# Patient Record
Sex: MALE | Race: WHITE | Employment: FULL TIME | URBAN - METROPOLITAN AREA
[De-identification: names, ages, dates, MRNs, and addresses within clinical notes are randomized per-mention and may not be internally consistent; named-entity substitution may affect disease eponyms.]

---

## 2019-05-22 NOTE — PROGRESS NOTES
"Subjective     Shant Carcamo is a 23 year old male who presents to clinic today for the following health issues:    HPI   Concern - fingernail  Onset: 10-11 months    Description:   Right ring finger - thinks he might have snagged it on something in his sleep but no recollection of this. Denies any trauma. No use of chemicals at work or other new contacts. He does note some dry skin on his feet but otherwise no other nail issues or skin changes.     Intensity: moderate    Progression of Symptoms:  same    Accompanying Signs & Symptoms:  none    Previous history of similar problem:   none    Precipitating factors:   Worsened by: none    Alleviating factors:  Improved by: none    Therapies Tried and outcome: trying to keep it clean    Went to  in January - was told to let it grow and put an antifungal (Fungi-Nail) on it. He has been using this up to 4 times daily since January or February. No change in appearance.      Reviewed and updated as needed this visit by Provider  Tobacco  Allergies  Meds  Problems  Med Hx  Surg Hx  Fam Hx         Review of Systems   Constitutional: Negative.    HENT: Negative.    Respiratory: Negative.    Cardiovascular: Negative.    Gastrointestinal: Negative.    Endocrine: Negative.    Genitourinary: Negative.    Musculoskeletal: Negative.    Skin: Negative.    Allergic/Immunologic: Negative.    Neurological: Negative.    Hematological: Negative.    Psychiatric/Behavioral: Negative.    All other systems reviewed and are negative.         Objective    /74   Pulse 70   Temp 97.9  F (36.6  C) (Oral)   Ht 1.88 m (6' 2\")   Wt 97.1 kg (214 lb)   SpO2 97%   BMI 27.48 kg/m    Body mass index is 27.48 kg/m .  Physical Exam   GENERAL: healthy, alert and no distress  SKIN: right 4th fingernail lifted from nailbed with white debris under nail - slightly thickened/discolored.    Diagnostic Test Results:  none         Assessment & Plan     1. Nail disorder, unspecified: " "unclear cause for nail separation and discoloration. ?fungal nail infection. Collected debris/nail clipping to send for culture. Okay to continue using topical antifungal for now. If culture negative, refer to derm given no history of trauma or other cause for nail separation.  - Fungus Culture,  skin, hair, or nail     BMI:   Estimated body mass index is 27.48 kg/m  as calculated from the following:    Height as of this encounter: 1.88 m (6' 2\").    Weight as of this encounter: 97.1 kg (214 lb).   Weight management plan: Discussed healthy diet and exercise guidelines      Return in about 2 weeks (around 6/6/2019) for follow up if symptoms not improving.    Nikolas Uriostegui, DO  [facility] [place]        "

## 2019-05-23 ENCOUNTER — OFFICE VISIT (OUTPATIENT)
Dept: FAMILY MEDICINE | Facility: CLINIC | Age: 24
End: 2019-05-23
Payer: COMMERCIAL

## 2019-05-23 VITALS
WEIGHT: 214 LBS | SYSTOLIC BLOOD PRESSURE: 116 MMHG | HEIGHT: 74 IN | TEMPERATURE: 97.9 F | OXYGEN SATURATION: 97 % | HEART RATE: 70 BPM | DIASTOLIC BLOOD PRESSURE: 74 MMHG | BODY MASS INDEX: 27.46 KG/M2

## 2019-05-23 DIAGNOSIS — L60.9 NAIL DISORDER, UNSPECIFIED: Primary | ICD-10-CM

## 2019-05-23 PROCEDURE — 99203 OFFICE O/P NEW LOW 30 MIN: CPT | Performed by: FAMILY MEDICINE

## 2019-05-23 PROCEDURE — 87101 SKIN FUNGI CULTURE: CPT | Performed by: FAMILY MEDICINE

## 2019-05-23 ASSESSMENT — ENCOUNTER SYMPTOMS
ENDOCRINE NEGATIVE: 1
PSYCHIATRIC NEGATIVE: 1
CONSTITUTIONAL NEGATIVE: 1
NEUROLOGICAL NEGATIVE: 1
ALLERGIC/IMMUNOLOGIC NEGATIVE: 1
HEMATOLOGIC/LYMPHATIC NEGATIVE: 1
CARDIOVASCULAR NEGATIVE: 1
RESPIRATORY NEGATIVE: 1
MUSCULOSKELETAL NEGATIVE: 1
GASTROINTESTINAL NEGATIVE: 1

## 2019-05-23 ASSESSMENT — MIFFLIN-ST. JEOR: SCORE: 2035.45

## 2019-05-27 DIAGNOSIS — L60.9 NAIL DISORDER, UNSPECIFIED: Primary | ICD-10-CM

## 2019-06-10 ENCOUNTER — MYC MEDICAL ADVICE (OUTPATIENT)
Dept: FAMILY MEDICINE | Facility: CLINIC | Age: 24
End: 2019-06-10

## 2019-06-12 ENCOUNTER — MYC MEDICAL ADVICE (OUTPATIENT)
Dept: FAMILY MEDICINE | Facility: CLINIC | Age: 24
End: 2019-06-12

## 2019-06-20 LAB
BACTERIA SPEC CULT: NORMAL
SPECIMEN SOURCE: NORMAL

## 2019-07-09 ENCOUNTER — OFFICE VISIT (OUTPATIENT)
Dept: FAMILY MEDICINE | Facility: CLINIC | Age: 24
End: 2019-07-09
Payer: COMMERCIAL

## 2019-07-09 VITALS — DIASTOLIC BLOOD PRESSURE: 74 MMHG | SYSTOLIC BLOOD PRESSURE: 118 MMHG

## 2019-07-09 DIAGNOSIS — L60.1 ONYCHOLYSIS: Primary | ICD-10-CM

## 2019-07-09 LAB
BASOPHILS # BLD AUTO: 0 10E9/L (ref 0–0.2)
BASOPHILS NFR BLD AUTO: 0.3 %
DIFFERENTIAL METHOD BLD: NORMAL
EOSINOPHIL # BLD AUTO: 0.4 10E9/L (ref 0–0.7)
EOSINOPHIL NFR BLD AUTO: 6 %
ERYTHROCYTE [DISTWIDTH] IN BLOOD BY AUTOMATED COUNT: 12.6 % (ref 10–15)
HCT VFR BLD AUTO: 43.2 % (ref 40–53)
HGB BLD-MCNC: 14.7 G/DL (ref 13.3–17.7)
LYMPHOCYTES # BLD AUTO: 2.3 10E9/L (ref 0.8–5.3)
LYMPHOCYTES NFR BLD AUTO: 36.9 %
MCH RBC QN AUTO: 29.9 PG (ref 26.5–33)
MCHC RBC AUTO-ENTMCNC: 34 G/DL (ref 31.5–36.5)
MCV RBC AUTO: 88 FL (ref 78–100)
MONOCYTES # BLD AUTO: 0.7 10E9/L (ref 0–1.3)
MONOCYTES NFR BLD AUTO: 11.4 %
NEUTROPHILS # BLD AUTO: 2.8 10E9/L (ref 1.6–8.3)
NEUTROPHILS NFR BLD AUTO: 45.4 %
PLATELET # BLD AUTO: 199 10E9/L (ref 150–450)
RBC # BLD AUTO: 4.91 10E12/L (ref 4.4–5.9)
WBC # BLD AUTO: 6.1 10E9/L (ref 4–11)

## 2019-07-09 PROCEDURE — 85025 COMPLETE CBC W/AUTO DIFF WBC: CPT | Performed by: FAMILY MEDICINE

## 2019-07-09 PROCEDURE — 88304 TISSUE EXAM BY PATHOLOGIST: CPT | Mod: TC | Performed by: FAMILY MEDICINE

## 2019-07-09 PROCEDURE — 88312 SPECIAL STAINS GROUP 1: CPT | Performed by: FAMILY MEDICINE

## 2019-07-09 PROCEDURE — 36415 COLL VENOUS BLD VENIPUNCTURE: CPT | Performed by: FAMILY MEDICINE

## 2019-07-09 PROCEDURE — 82728 ASSAY OF FERRITIN: CPT | Performed by: FAMILY MEDICINE

## 2019-07-09 PROCEDURE — 99213 OFFICE O/P EST LOW 20 MIN: CPT | Performed by: FAMILY MEDICINE

## 2019-07-09 NOTE — LETTER
7/9/2019         RE: Shant Carcamo  [address] [place] [address]        Dear Colleague,    Thank you for referring your patient, Shant Carcamo, to the [facility]. Please see a copy of my visit note below.    [facility] - PRIMARY CARE SKIN    CC:  fingernail  SUBJECTIVE:   Shant Carcamo is a(n) 24 year old male who presents to clinic today because of nail separation of the right ring finger that started 1 year ago.    Nail separation from the nail bed was noticed acutely upon waking one morning. He does not recall any history of injury to the finger. He had tried to clean out debris underneath the nail prior to the nail separation.    Fungal culture has been negative.    Family Medical History  Skin Cancer: NO  Eczema Psoriasis Autoimmune   NO YES - in a half-brother NO     Occupation: previously warehouse, ,  (mostly indoor).    Refer to electronic medical record (EMR) for past medical history and medications.    INTEGUMENTARY/SKIN: POSITIVE for nail changes  ROS: 14 point review of systems was negative except the symptoms listed above in the HPI.    This document serves as a record of the services and decisions personally performed and made by Lexi Rodriges MD and was created by Luis Conti, a trained medical scribe, based on personal observations and provider statements to the medical scribe.  July 9, 2019 8:50 AM   Luis Conti    OBJECTIVE:   GENERAL: healthy, alert and no distress.  SKIN: Neumann Skin Type - I-II.  Fingers examined. The dermatoscope was used to help evaluate pigmented lesions.  Skin Pertinent Findings:  Right ring finger: Separation of the distal 3/4 of nail. Thick keratin at base of nail.    Other fingernails appear healthy and normal.    Diagnostic Test Results:      ASSESSMENT:     Encounter Diagnosis   Name Primary?     Onycholysis Yes     MDM: most likely related to repetitive trama    PLAN:   Patient Instructions    FUTURE APPOINTMENTS  Please get labs done today.  Follow up per pathology results.    TT: 20 minutes.  CT: 15 minutes.    The information in this document, created by the medical scribe for me, accurately reflects the services I personally performed and the decisions made by me. I have reviewed and approved this document for accuracy prior to leaving the patient care area.  July 9, 2019 8:50 AM  Lexi Rodriges MD  [facility]    Again, thank you for allowing me to participate in the care of your patient.        Sincerely,        Lexi Rodriges MD

## 2019-07-09 NOTE — LETTER
July 9, 2019    Shant Carcamo  [address] [place] [address]  MEDICAL EXCUSE FORM      Shant Carcamo saw me on 7/9/2019 for a medical appointment.    Shant can return to work today with out restrictions    COMMENTS: None        Sincerely,      Lexi Rodriges M.D.

## 2019-07-09 NOTE — PROGRESS NOTES
[facility] - PRIMARY CARE SKIN    CC:  fingernail  SUBJECTIVE:   Shant Carcamo is a(n) 24 year old male who presents to clinic today because of nail separation of the right ring finger that started 1 year ago.    Nail separation from the nail bed was noticed acutely upon waking one morning. He does not recall any history of injury to the finger. He had tried to clean out debris underneath the nail prior to the nail separation.    Fungal culture has been negative.    Family Medical History  Skin Cancer: NO  Eczema Psoriasis Autoimmune   NO YES - in a half-brother NO     Occupation: previously warehouse, ,  (mostly indoor).    Refer to electronic medical record (EMR) for past medical history and medications.    INTEGUMENTARY/SKIN: POSITIVE for nail changes  ROS: 14 point review of systems was negative except the symptoms listed above in the HPI.    This document serves as a record of the services and decisions personally performed and made by Lexi Rodriges MD and was created by Luis Conti, a trained medical scribe, based on personal observations and provider statements to the medical scribe.  July 9, 2019 8:50 AM   Luis Conti    OBJECTIVE:   GENERAL: healthy, alert and no distress.  SKIN: Neumann Skin Type - I-II.  Fingers examined. The dermatoscope was used to help evaluate pigmented lesions.  Skin Pertinent Findings:  Right ring finger: Separation of the distal 3/4 of nail. Thick keratin at base of nail.    Other fingernails appear healthy and normal.    Diagnostic Test Results:      ASSESSMENT:     Encounter Diagnosis   Name Primary?     Onycholysis Yes     MDM: most likely related to repetitive trama    PLAN:   Patient Instructions   FUTURE APPOINTMENTS  Please get labs done today.  Follow up per pathology results.    TT: 20 minutes.  CT: 15 minutes.    The information in this document, created by the medical scribe for me, accurately reflects the services I personally  performed and the decisions made by me. I have reviewed and approved this document for accuracy prior to leaving the patient care area.  July 9, 2019 8:50 AM  Lexi Rodriges MD  [place]

## 2019-07-10 LAB — FERRITIN SERPL-MCNC: 101 NG/ML (ref 26–388)

## 2019-07-15 LAB — COPATH REPORT: NORMAL

## 2019-07-18 ENCOUNTER — TELEPHONE (OUTPATIENT)
Dept: FAMILY MEDICINE | Facility: CLINIC | Age: 24
End: 2019-07-18

## 2019-07-18 DIAGNOSIS — B35.1 ONYCHOMYCOSIS: Primary | ICD-10-CM

## 2019-07-18 RX ORDER — TERBINAFINE HYDROCHLORIDE 250 MG/1
250 TABLET ORAL DAILY
Qty: 42 TABLET | Refills: 0 | Status: SHIPPED | OUTPATIENT
Start: 2019-07-18 | End: 2019-10-15

## 2019-07-18 NOTE — TELEPHONE ENCOUNTER
Left message on answering machine for patient to call back.    Adrianna Calzada,RN BSN  [facility]  [phone]

## 2019-07-18 NOTE — TELEPHONE ENCOUNTER
----- Message from Lexi Rodriges MD sent at 7/18/2019  1:53 PM CDT -----  Call Shant explain that there was evidence of fungal infection.   Recommendations :      Terbinafine 250 mg one cap po q day times 6 weeks      Recheck at 6 weeks and LFT test at that time.      Treatment may required 3 months of treatment.     Faxed to [facility] pharmacy in [place]          Thank you,   Lexi Rodriges M.D.

## 2019-07-19 NOTE — TELEPHONE ENCOUNTER
Called and spoke to patient. Educated patient on culture results- evidence of fungal infection. Tx includes Terbinafine 250mg one cap po q day times 6 weeks. Advised patient to return to clinic for a re-check at 6 weeks and LFT test at that time. Informed patient 3 months of treatment may be required. Informed patient the Rx has been faxed to [facility] pharmacy in [place]. Patient voiced understanding.    RANJIT Fernandez-BSN  [facility]  [phone]

## 2019-07-19 NOTE — TELEPHONE ENCOUNTER
Reason for Call:  Other returning call    Detailed comments: The patient is returning a call left for him.     Phone Number Patient can be reached at: Cell number at [phone]    Best Time: Anytime    Can we leave a detailed message on this number? YES    Call taken on 7/19/2019 at 12:24 PM by Sonia Alvarado

## 2019-08-12 ENCOUNTER — TELEPHONE (OUTPATIENT)
Dept: FAMILY MEDICINE | Facility: CLINIC | Age: 24
End: 2019-08-12

## 2019-08-12 NOTE — TELEPHONE ENCOUNTER
Central Prior Authorization Team   Phone: [phone]    PA Initiation    Medication:   Insurance Company: [employer] - Phone [phone] Fax [phone]  Pharmacy Filling the Rx: [id number] [place] [address] [address]  Filling Pharmacy Phone: [phone]  Filling Pharmacy Fax:    Start Date: 8/12/2019    KERRY HU (Justice: [id number])

## 2019-08-12 NOTE — TELEPHONE ENCOUNTER
[facility]  pharmacy says they have contacted us multiple times for this pa- there are no previous contacts int is chart regarding this medication    Prior Authorization Retail Medication Request      Medication/Dose:Terbinafine   ICD code (if different than what is on RX):  Onychomycosis [B35.1]  - Primary   Previously Tried and Failed:Onychomycosis  Rationale:      Insurance Name:    Coverage information:     Subscriber: [id number] YANNI HU     Rel to sub: 03 - Child     Member ID: [id number]     Payor: [facility] Ph: [phone]     Benefit plan: [id number] Ph: [phone]     Group number: [id number]     Member effective dates: from 01/01/19          Pharmacy Information (if different than what is on RX)  Name:    Phone:

## 2019-08-13 NOTE — TELEPHONE ENCOUNTER
Central Prior Authorization Team   Phone: [phone]    Prior Authorization Approval    Authorization Effective Date: 8/13/2019  Authorization Expiration Date: 11/13/2019  Medication:   Approved Dose/Quantity:   Reference #: [id number]   Insurance Company: [employer] - Phone [phone] Fax [phone]  Expected CoPay:       CoPay Card Available:      Foundation Assistance Needed:    Which Pharmacy is filling the prescription (Not needed for infusion/clinic administered): [id number] [place] [facility] [address] [address]  Pharmacy Notified: Yes  Patient Notified: Yes, sent patient a [employer] message regarding approval

## 2019-10-15 ENCOUNTER — OFFICE VISIT (OUTPATIENT)
Dept: FAMILY MEDICINE | Facility: CLINIC | Age: 24
End: 2019-10-15
Payer: COMMERCIAL

## 2019-10-15 VITALS — SYSTOLIC BLOOD PRESSURE: 138 MMHG | DIASTOLIC BLOOD PRESSURE: 62 MMHG

## 2019-10-15 DIAGNOSIS — B35.1 ONYCHOMYCOSIS: Primary | ICD-10-CM

## 2019-10-15 DIAGNOSIS — Z51.81 ENCOUNTER FOR THERAPEUTIC DRUG MONITORING: ICD-10-CM

## 2019-10-15 DIAGNOSIS — L60.1 ONYCHOLYSIS: ICD-10-CM

## 2019-10-15 PROCEDURE — 36415 COLL VENOUS BLD VENIPUNCTURE: CPT | Performed by: FAMILY MEDICINE

## 2019-10-15 PROCEDURE — 99213 OFFICE O/P EST LOW 20 MIN: CPT | Performed by: FAMILY MEDICINE

## 2019-10-15 PROCEDURE — 80076 HEPATIC FUNCTION PANEL: CPT | Performed by: FAMILY MEDICINE

## 2019-10-15 RX ORDER — TERBINAFINE HYDROCHLORIDE 250 MG/1
250 TABLET ORAL DAILY
Qty: 90 TABLET | Refills: 0 | Status: SHIPPED | OUTPATIENT
Start: 2019-10-15 | End: 2020-03-02 | Stop reason: ALTCHOICE

## 2019-10-15 NOTE — PROGRESS NOTES
[facility] - PRIMARY CARE SKIN    CC: onychomycosis  SUBJECTIVE:   Shant Carcamo is a(n) 24 year old male who presents to clinic today for follow-up of onychomycosis involving the right ring finger first starting in summer 2018.    Nail separation from the nail bed was noticed acutely upon waking one morning. He has not recalled any history of injury to the finger. He had tried to clean out debris underneath the nail prior to the nail separation.    After pathology on 7/9/19 indicated onychomycosis, terbinafine 250 mg every day was started in August 2019 for 6 weeks. He is concerned about continued nail separation. He denies any side effects from oral terbinafine. He has also been using an OTC product Undecylenic Acid 25%.    Family Medical History  Skin Cancer: NO  Eczema Psoriasis Autoimmune   NO YES - in a half-brother NO     Occupation: previously warehouse, ,  previously (mostly indoor).    Refer to electronic medical record (EMR) for past medical history and medications.    ROS: 14 point review of systems was negative except the symptoms listed above in the HPI.    This document serves as a record of the services and decisions personally performed and made by Lexi Rodriges MD and was created by Luis Conti, a trained medical scribe, based on personal observations and provider statements to the medical scribe.  October 15, 2019 3:29 PM   Luis Conti    OBJECTIVE:   GENERAL: healthy, alert and no distress.  SKIN: Neumann Skin Type - I-II.  Fingers examined. The dermatoscope was used to help evaluate pigmented lesions.  Skin Pertinent Findings:  Right ring finger: dense white changes involving distal 3/4 of the nail, lifting of the nail from the bed distally; proximal 1/4 of the nail has normal growth    ASSESSMENT:     Encounter Diagnoses   Name Primary?     Onychomycosis Yes     Encounter for therapeutic drug monitoring      Onycholysis      MDM: explained duration of treatment  with terbinafine and by treating the fungal infection the nail should ultimately stay attached as the proximal nail grows out.    PLAN:   Patient Instructions   FUTURE APPOINTMENTS  Please get labs done today.  Follow up in 3 months.    ORAL ANTIFUNGAL  Take by mouth 1 tablet of terbinafine (Lamisil) 250 mg one time(s) a day for 3 more months. Make sure to finish the entire antifungal regimen.    Consider soaking the fingernail for 5-10 minutes prior to trimming it.    TT: 20 minutes.  CT: 15 minutes.    The information in this document, created by the medical scribe for me, accurately reflects the services I personally performed and the decisions made by me. I have reviewed and approved this document for accuracy prior to leaving the patient care area.  October 15, 2019 3:28 PM  Lexi Rodriges MD  [facility]

## 2019-10-15 NOTE — LETTER
October 15, 2019    Shant Carcamo  [address] [address]  MEDICAL EXCUSE FORM      Shant Carcamo seen and treated by me on 10/15/2019 at the clinic. Any questions please contact me at the clinic.        COMMENTS: None        Sincerely,      Lexi Rodriges M.D.

## 2019-10-15 NOTE — PATIENT INSTRUCTIONS
FUTURE APPOINTMENTS  Please get labs done today.  Follow up in 3 months.    ORAL ANTIFUNGAL  Take by mouth 1 tablet of terbinafine (Lamisil) 250 mg one time(s) a day for 3 more months. Make sure to finish the entire antifungal regimen.    Consider soaking the fingernail for 5-10 minutes prior to trimming it.

## 2019-10-15 NOTE — LETTER
10/15/2019         RE: Shant Carcamo  [address] [address]        Dear Colleague,    Thank you for referring your patient, Shant Carcamo, to the [facility]. Please see a copy of my visit note below.    [facility] - PRIMARY CARE SKIN    CC: onychomycosis  SUBJECTIVE:   Shant Carcamo is a(n) 24 year old male who presents to clinic today for follow-up of onychomycosis involving the right ring finger first starting in summer 2018.    Nail separation from the nail bed was noticed acutely upon waking one morning. He has not recalled any history of injury to the finger. He had tried to clean out debris underneath the nail prior to the nail separation.    After pathology on 7/9/19 indicated onychomycosis, terbinafine 250 mg every day was started in August 2019 for 6 weeks. He is concerned about continued nail separation. He denies any side effects from oral terbinafine. He has also been using an OTC product Undecylenic Acid 25%.    Family Medical History  Skin Cancer: NO  Eczema Psoriasis Autoimmune   NO YES - in a half-brother NO     Occupation: previously warehouse, ,  previously (mostly indoor).    Refer to electronic medical record (EMR) for past medical history and medications.    ROS: 14 point review of systems was negative except the symptoms listed above in the HPI.    This document serves as a record of the services and decisions personally performed and made by Lexi Rodriges MD and was created by Luis Conti, a trained medical scribe, based on personal observations and provider statements to the medical scribe.  October 15, 2019 3:29 PM   Luis Conti    OBJECTIVE:   GENERAL: healthy, alert and no distress.  SKIN: Neumann Skin Type - I-II.  Fingers examined. The dermatoscope was used to help evaluate pigmented lesions.  Skin Pertinent Findings:  Right ring finger: dense white changes involving distal 3/4 of the nail, lifting of the nail from the  bed distally; proximal 1/4 of the nail has normal growth    ASSESSMENT:     Encounter Diagnoses   Name Primary?     Onychomycosis Yes     Encounter for therapeutic drug monitoring      Onycholysis      MDM: explained duration of treatment with terbinafine and by treating the fungal infection the nail should ultimately stay attached as the proximal nail grows out.    PLAN:   Patient Instructions   FUTURE APPOINTMENTS  Please get labs done today.  Follow up in 3 months.    ORAL ANTIFUNGAL  Take by mouth 1 tablet of terbinafine (Lamisil) 250 mg one time(s) a day for 3 more months. Make sure to finish the entire antifungal regimen.    Consider soaking the fingernail for 5-10 minutes prior to trimming it.    TT: 20 minutes.  CT: 15 minutes.    The information in this document, created by the medical scribe for me, accurately reflects the services I personally performed and the decisions made by me. I have reviewed and approved this document for accuracy prior to leaving the patient care area.  October 15, 2019 3:28 PM  Lexi Rodriges MD  [facility]    Again, thank you for allowing me to participate in the care of your patient.        Sincerely,        Lexi Rodriges MD

## 2019-10-16 LAB
ALBUMIN SERPL-MCNC: 4.8 G/DL (ref 3.4–5)
ALP SERPL-CCNC: 74 U/L (ref 40–150)
ALT SERPL W P-5'-P-CCNC: 34 U/L (ref 0–70)
AST SERPL W P-5'-P-CCNC: 19 U/L (ref 0–45)
BILIRUB DIRECT SERPL-MCNC: <0.1 MG/DL (ref 0–0.2)
BILIRUB SERPL-MCNC: 0.5 MG/DL (ref 0.2–1.3)
PROT SERPL-MCNC: 7.6 G/DL (ref 6.8–8.8)

## 2019-11-07 ENCOUNTER — HEALTH MAINTENANCE LETTER (OUTPATIENT)
Age: 24
End: 2019-11-07

## 2019-12-13 DIAGNOSIS — B35.1 ONYCHOMYCOSIS: ICD-10-CM

## 2019-12-13 DIAGNOSIS — Z51.81 ENCOUNTER FOR THERAPEUTIC DRUG MONITORING: ICD-10-CM

## 2019-12-13 RX ORDER — TERBINAFINE HYDROCHLORIDE 250 MG/1
250 TABLET ORAL DAILY
Qty: 90 TABLET | Refills: 0 | Status: CANCELLED | OUTPATIENT
Start: 2019-12-13

## 2019-12-13 NOTE — TELEPHONE ENCOUNTER
Reason for Call:  Other prescription    Detailed comments: Pt's mother called this afternoon and would like a refill on his lamisil prescription. Please refill this and give pt a call if there are any questions. Thank you.    Phone Number Patient can be reached at: Home number on file [phone] (home)    Best Time:     Can we leave a detailed message on this number? YES    Call taken on 12/13/2019 at 3:21 PM by Crystal Arce

## 2019-12-16 NOTE — TELEPHONE ENCOUNTER
Called home number- no CTC on file- advised mother that he needs to call back    She will let him know    should have medication til 1/15/19  Last Written Prescription Date:  10/15/19  Last Fill Quantity: 90,  # refills: 0   Last office visit: 10/15/2019 with prescribing provider:     Future Office Visit:   Next 5 appointments (look out 90 days)    Jan 21, 2020  3:20 PM CST  Return Visit with Lexi Rodriges MD  [facility] ([facility] [address] [place] [id number]  [phone]         Requested Prescriptions   Pending Prescriptions Disp Refills     terbinafine (LAMISIL) 250 MG tablet 90 tablet 0     Sig: Take 1 tablet (250 mg) by mouth daily       There is no refill protocol information for this order

## 2019-12-16 NOTE — TELEPHONE ENCOUNTER
Called patient- he states that the pharmacy did not give him a 90 day supply- he has been getting small refills.    Called pharmacist who states patiens mother picked up a 30 day supply on the 13th. They paid 14 $ with [facility] assistance card.  He will need a prior auth if they get any further medication.  Called patient and advised him of this .   he has an appointment scheduled with Dr. Rodriges in Jan.    Adrianna RODRIGUEZRN BSN  [facility]  [phone]

## 2019-12-16 NOTE — TELEPHONE ENCOUNTER
Patient returning our call. Please call him again on his cell phone. [phone]. OK to leave a detailed message  Sarah Hanna, Clinic Receptionist

## 2020-01-21 ENCOUNTER — OFFICE VISIT (OUTPATIENT)
Dept: FAMILY MEDICINE | Facility: CLINIC | Age: 25
End: 2020-01-21
Payer: COMMERCIAL

## 2020-01-21 VITALS — DIASTOLIC BLOOD PRESSURE: 80 MMHG | SYSTOLIC BLOOD PRESSURE: 138 MMHG

## 2020-01-21 DIAGNOSIS — B35.1 ONYCHOMYCOSIS: Primary | ICD-10-CM

## 2020-01-21 PROCEDURE — 80076 HEPATIC FUNCTION PANEL: CPT | Performed by: FAMILY MEDICINE

## 2020-01-21 PROCEDURE — 99213 OFFICE O/P EST LOW 20 MIN: CPT | Performed by: FAMILY MEDICINE

## 2020-01-21 PROCEDURE — 36415 COLL VENOUS BLD VENIPUNCTURE: CPT | Performed by: FAMILY MEDICINE

## 2020-01-21 RX ORDER — ITRACONAZOLE 100 MG/1
200 CAPSULE ORAL DAILY
Qty: 84 CAPSULE | Refills: 0 | Status: SHIPPED | OUTPATIENT
Start: 2020-01-21 | End: 2020-03-02

## 2020-01-21 NOTE — PATIENT INSTRUCTIONS
FUTURE APPOINTMENTS  Please get labs done today.  Follow up in 6 week(s).    ORAL ANTIFUNGAL  Take by mouth 2 tablets of itraconazole 100 mg one time(s) a day for 6 weeks.    Discontinue terbinafine at this time.    BLEACH BATH/SOAK INSTRUCTIONS  Bleach Bath/Soak:  A dilute bleach solution can be helpful in maintaining normal skin leola.  Frequency: once a day for 1 week, then decrease to three times a week.    Perform a dilute bleach bath adding plain 6% bleach in the following amounts to warm water. Do not use concentrated bleach.    Dilution amounts will be more dilute than typical swimming pools:    Mixing bowl - use 2 tbsp of bleach    Wait until water and bleach have been mixed before soaking.    Soak for 5-15 minutes.    Pat skin dry with white towels. Take care that bleach may stain towels.    Follow with a bland emollient moisturizer or cream such as Vaseline or Aquaphor.    Never apply bleach directly to your skin.  Avoid oatmeal soaks, because they only provide short-term, temporary relief of itch.

## 2020-01-21 NOTE — LETTER
January 21, 2020      Shant Carcamo  [address] [place] [address]        To Whom It May Concern:    Shant Carcamo  was seen on January 21, 2020            Sincerely,        Lexi Rodriges MD

## 2020-01-21 NOTE — PROGRESS NOTES
[facility] - PRIMARY CARE SKIN    CC: onychomycosis  SUBJECTIVE:   Shant Carcamo is a(n) 24 year old male who presents to clinic today for follow-up of onychomycosis involving the right ring finger first starting in summer 2018.    Nail separation from the nail bed was noticed acutely upon waking one morning. He has not recalled any history of injury to the finger. He had tried to clean out debris underneath the nail prior to the nail separation. After pathology on 7/9/19 indicated onychomycosis, terbinafine 250 mg every day was started in August 2019 for 6 weeks. He denies any side effects from oral terbinafine. Since Oct 15, 2019, he has continued taking terbinafine 250 mg once daily.    He has not noticed any improvement of the finger nail.    Family Medical History  Skin Cancer: NO  Eczema Psoriasis Autoimmune   NO YES - in a half-brother NO     Occupation: previously warehouse, ,  previously (mostly indoor).    Refer to electronic medical record (EMR) for past medical history and medications.    ROS: 14 point review of systems was negative except the symptoms listed above in the HPI.    This document serves as a record of the services and decisions personally performed and made by Lexi Rodriges MD and was created by Luis Conti, a trained medical scribe, based on personal observations and provider statements to the medical scribe.  January 21, 2020 3:24 PM   Luis Conti    OBJECTIVE:   GENERAL: healthy, alert and no distress.  SKIN: Neumann Skin Type - I-II.  Fingers examined. The dermatoscope was used to help evaluate pigmented lesions.  Skin Pertinent Findings:  Right ring finger: Dense white changes involving distal 3/4 of the nail, lifting of the nail from the bed distally; proximal 1/4 of the nail has normal growth. Debris collection under the nail.    ASSESSMENT:     Encounter Diagnosis   Name Primary?     Onychomycosis Yes     MDM:  He is dissatisfied with the lack of  results with oral terbinafine and states that he might be interested in a mechanical/surgical treatment to remove the infected nail. Briefly discussed the process of surgical removal of nail.    PLAN:   Patient Instructions   FUTURE APPOINTMENTS  Please get labs done today.  Follow up in 6 week(s).    ORAL ANTIFUNGAL  Take by mouth 2 tablets of itraconazole 100 mg one time(s) a day for 6 weeks.    Discontinue terbinafine at this time.    BLEACH BATH/SOAK INSTRUCTIONS  Bleach Bath/Soak:  A dilute bleach solution can be helpful in maintaining normal skin leola.  Frequency: once a day for 1 week, then decrease to three times a week.    Perform a dilute bleach bath adding plain 6% bleach in the following amounts to warm water. Do not use concentrated bleach.    Dilution amounts will be more dilute than typical swimming pools:    Mixing bowl - use 2 tbsp of bleach    Wait until water and bleach have been mixed before soaking.    Soak for 5-15 minutes.    Pat skin dry with white towels. Take care that bleach may stain towels.    Follow with a bland emollient moisturizer or cream such as Vaseline or Aquaphor.    Never apply bleach directly to your skin.  Avoid oatmeal soaks, because they only provide short-term, temporary relief of itch.      TT: 20 minutes.  CT: 15 minutes.    The information in this document, created by the medical scribe for me, accurately reflects the services I personally performed and the decisions made by me. I have reviewed and approved this document for accuracy prior to leaving the patient care area.  January 21, 2020 3:23 PM  Lexi Rodriges MD  [facility]

## 2020-01-21 NOTE — LETTER
1/21/2020         RE: Shant Carcamo  [address] [place] [address]        Dear Colleague,    Thank you for referring your patient, Shant Carcamo, to the [facility]. Please see a copy of my visit note below.    [facility] - PRIMARY CARE SKIN    CC: onychomycosis  SUBJECTIVE:   Shant Carcamo is a(n) 24 year old male who presents to clinic today for follow-up of onychomycosis involving the right ring finger first starting in summer 2018.    Nail separation from the nail bed was noticed acutely upon waking one morning. He has not recalled any history of injury to the finger. He had tried to clean out debris underneath the nail prior to the nail separation. After pathology on 7/9/19 indicated onychomycosis, terbinafine 250 mg every day was started in August 2019 for 6 weeks. He denies any side effects from oral terbinafine. Since Oct 15, 2019, he has continued taking terbinafine 250 mg once daily.    He has not noticed any improvement of the finger nail.    Family Medical History  Skin Cancer: NO  Eczema Psoriasis Autoimmune   NO YES - in a half-brother NO     Occupation: previously warehouse, ,  previously (mostly indoor).    Refer to electronic medical record (EMR) for past medical history and medications.    ROS: 14 point review of systems was negative except the symptoms listed above in the HPI.    This document serves as a record of the services and decisions personally performed and made by Lexi Rodriges MD and was created by Luis Conti, a trained medical scribe, based on personal observations and provider statements to the medical scribe.  January 21, 2020 3:24 PM   Luis Conti    OBJECTIVE:   GENERAL: healthy, alert and no distress.  SKIN: Neumann Skin Type - I-II.  Fingers examined. The dermatoscope was used to help evaluate pigmented lesions.  Skin Pertinent Findings:  Right ring finger: Dense white changes involving distal 3/4 of the nail, lifting of  the nail from the bed distally; proximal 1/4 of the nail has normal growth. Debris collection under the nail.    ASSESSMENT:     Encounter Diagnosis   Name Primary?     Onychomycosis Yes     MDM:  He is dissatisfied with the lack of results with oral terbinafine and states that he might be interested in a mechanical/surgical treatment to remove the infected nail. Briefly discussed the process of surgical removal of nail.    PLAN:   Patient Instructions   FUTURE APPOINTMENTS  Please get labs done today.  Follow up in 6 week(s).    ORAL ANTIFUNGAL  Take by mouth 2 tablets of itraconazole 100 mg one time(s) a day for 6 weeks.    Discontinue terbinafine at this time.    BLEACH BATH/SOAK INSTRUCTIONS  Bleach Bath/Soak:  A dilute bleach solution can be helpful in maintaining normal skin leola.  Frequency: once a day for 1 week, then decrease to three times a week.    Perform a dilute bleach bath adding plain 6% bleach in the following amounts to warm water. Do not use concentrated bleach.    Dilution amounts will be more dilute than typical swimming pools:    Mixing bowl - use 2 tbsp of bleach    Wait until water and bleach have been mixed before soaking.    Soak for 5-15 minutes.    Pat skin dry with white towels. Take care that bleach may stain towels.    Follow with a bland emollient moisturizer or cream such as Vaseline or Aquaphor.    Never apply bleach directly to your skin.  Avoid oatmeal soaks, because they only provide short-term, temporary relief of itch.      TT: 20 minutes.  CT: 15 minutes.    The information in this document, created by the medical scribe for me, accurately reflects the services I personally performed and the decisions made by me. I have reviewed and approved this document for accuracy prior to leaving the patient care area.  January 21, 2020 3:23 PM  Lexi Rodriges MD  [facility]    Again, thank you for allowing me to participate in the care of your patient.         Sincerely,        Lexi Rodriges MD

## 2020-01-22 LAB
ALBUMIN SERPL-MCNC: 4.8 G/DL (ref 3.4–5)
ALP SERPL-CCNC: 62 U/L (ref 40–150)
ALT SERPL W P-5'-P-CCNC: 31 U/L (ref 0–70)
AST SERPL W P-5'-P-CCNC: 20 U/L (ref 0–45)
BILIRUB DIRECT SERPL-MCNC: <0.1 MG/DL (ref 0–0.2)
BILIRUB SERPL-MCNC: 0.6 MG/DL (ref 0.2–1.3)
PROT SERPL-MCNC: 8 G/DL (ref 6.8–8.8)

## 2020-01-23 ENCOUNTER — TELEPHONE (OUTPATIENT)
Dept: FAMILY MEDICINE | Facility: CLINIC | Age: 25
End: 2020-01-23

## 2020-01-23 NOTE — TELEPHONE ENCOUNTER
Prior Authorization Retail Medication Request    Medication/Dose: itraconazole (SPORANOX) 100 MG capsule  ICD code (if different than what is on RX):    Previously Tried and Failed:    Rationale:      Insurance Name:  [employer] [place]  Insurance ID:  [id number]      Pharmacy Information (if different than what is on RX)  Name:  same  Phone:  [phone]

## 2020-01-23 NOTE — TELEPHONE ENCOUNTER
----- Message from Lexi Rodriges MD sent at 1/22/2020  3:38 PM CST -----  Please call :    Lab work looks normal.     Thank you,   Lexi Rodirges M.D.

## 2020-01-23 NOTE — TELEPHONE ENCOUNTER
Left message on answering machine for patient to call back.  Adrianna RODRIGUEZRN BSN  [facility]  [phone]      ORAL ANTIFUNGAL  Take by mouth 2 tablets of itraconazole 100 mg one time(s) a day for 6 weeks.     Discontinue terbinafine at this time.

## 2020-01-24 NOTE — TELEPHONE ENCOUNTER
Patient notified of test results and providers message, patient has no further questions.    Adrianna RODRIGUEZRN BSN  [facility]  [phone]

## 2020-01-27 NOTE — TELEPHONE ENCOUNTER
Central Prior Authorization Team   Phone: [phone]    PA Initiation    Medication: itraconazole (SPORANOX) 100 MG capsule  Insurance Company: [employer] - Phone [phone] Fax [phone]  Pharmacy Filling the Rx: [id number] - [place], [place] - [address] [address]  Filling Pharmacy Phone: [phone]  Filling Pharmacy Fax: [phone]  Start Date: 1/27/2020

## 2020-01-28 NOTE — TELEPHONE ENCOUNTER
Prior Authorization Approval    Authorization Effective Date: 1/27/2020  Authorization Expiration Date: 4/26/2020  Medication: itraconazole (SPORANOX) 100 MG-APPROVED  Approved Dose/Quantity:    Reference #:     Insurance Company: [employer] - Phone [phone] Fax [phone]  Expected CoPay:       CoPay Card Available:      Foundation Assistance Needed:    Which Pharmacy is filling the prescription (Not needed for infusion/clinic administered): [id number] - [place], [place] - [address] [address]  Pharmacy Notified: Yes  Patient Notified: Yes  **Instructed pharmacy to notify patient when script is ready to /ship.**

## 2020-02-25 NOTE — PROGRESS NOTES
[facility] - PRIMARY CARE SKIN    CC: onychomycosis  SUBJECTIVE:   Shant Carcamo is a(n) 24 year old male who presents to clinic today for follow-up of onychomycosis involving the right ring finger first starting in summer 2018.    Nail separation from the nail bed was noticed acutely upon waking one morning. He has not recalled any history of injury to the finger. He had tried to clean out debris underneath the nail prior to the nail separation. After pathology on 7/9/19 indicated onychomycosis, terbinafine 250 mg every day was started in August 2019 and taken through January 2020. He denies any side effects from oral terbinafine. Therapy was changed to itraconazole 200 mg every day on 1/21/2020 and bleach soaks. He last took itraconazole on Friday, three days ago. He is using bleach soaks 2-3 times a week currently. He is concerned that whiteness of the nail is progressing proximally. He is trying to cover his hands every night. He does not recall any recent trauma to the nail.    We briefly discussed the process of surgical removal of nail at his last office visit if oral therapy is not effective.    Family Medical History  Skin Cancer: NO  Eczema Psoriasis Autoimmune   NO YES - in a half-brother NO     Occupation: previously warehouse, ,  previously (mostly indoor).    Refer to electronic medical record (EMR) for past medical history and medications.    ROS: 14 point review of systems was negative except the symptoms listed above in the HPI.    This document serves as a record of the services and decisions personally performed and made by Lexi Rodriges MD and was created by Luis Conti, a trained medical scribe, based on personal observations and provider statements to the medical scribe.  March 2, 2020 3:31 PM   Luis Conti    OBJECTIVE:   GENERAL: healthy, alert and no distress.  HEENT: Eyes grossly normal to inspection, PERRL and conjunctivae and sclerae normal.  SKIN: Neumann  Skin Type - I-II.  Fingers examined. The dermatoscope was used to help evaluate pigmented lesions.  Skin Pertinent Findings:  Right ring finger: Healthy proximal nail. Still lifting of the distal nail involving the first 3/4 of the proximal nail.    ASSESSMENT:     Encounter Diagnosis   Name Primary?     Onychomycosis Yes     MDM:  If there is not continue normal growth of the nail then he would like to consider removal of the nail.    PLAN:   Patient Instructions   FUTURE APPOINTMENTS    Please get labs done today.    Follow up in 4 weeks for a 40 minute appointment for re-evaluation and potential fingernail removal.    ORAL MEDICATION  Take by mouth 2 capsule(s)/tablet(s) of itraconazole 100 mg once daily for 4 more weeks.    You may discontinue bleach soaks.    TT: 20 minutes.  CT: 15 minutes.    The information in this document, created by the medical scribe for me, accurately reflects the services I personally performed and the decisions made by me. I have reviewed and approved this document for accuracy prior to leaving the patient care area.  March 2, 2020 3:31 PM  Lexi Rodriges MD  [facility]

## 2020-03-02 ENCOUNTER — OFFICE VISIT (OUTPATIENT)
Dept: FAMILY MEDICINE | Facility: CLINIC | Age: 25
End: 2020-03-02
Payer: COMMERCIAL

## 2020-03-02 VITALS — SYSTOLIC BLOOD PRESSURE: 132 MMHG | DIASTOLIC BLOOD PRESSURE: 60 MMHG

## 2020-03-02 DIAGNOSIS — B35.1 ONYCHOMYCOSIS: Primary | ICD-10-CM

## 2020-03-02 PROCEDURE — 84460 ALANINE AMINO (ALT) (SGPT): CPT | Performed by: FAMILY MEDICINE

## 2020-03-02 PROCEDURE — 84450 TRANSFERASE (AST) (SGOT): CPT | Performed by: FAMILY MEDICINE

## 2020-03-02 PROCEDURE — 99213 OFFICE O/P EST LOW 20 MIN: CPT | Performed by: FAMILY MEDICINE

## 2020-03-02 PROCEDURE — 36415 COLL VENOUS BLD VENIPUNCTURE: CPT | Performed by: FAMILY MEDICINE

## 2020-03-02 RX ORDER — ITRACONAZOLE 100 MG/1
200 CAPSULE ORAL DAILY
Qty: 60 CAPSULE | Refills: 0 | Status: SHIPPED | OUTPATIENT
Start: 2020-03-02 | End: 2020-04-03

## 2020-03-02 NOTE — LETTER
3/2/2020         RE: Shant Carcamo  [address] [place] [address]        Dear Colleague,    Thank you for referring your patient, Shant Carcamo, to the [facility]. Please see a copy of my visit note below.    [facility] - PRIMARY CARE SKIN    CC: onychomycosis  SUBJECTIVE:   Shant Carcamo is a(n) 24 year old male who presents to clinic today for follow-up of onychomycosis involving the right ring finger first starting in summer 2018.    Nail separation from the nail bed was noticed acutely upon waking one morning. He has not recalled any history of injury to the finger. He had tried to clean out debris underneath the nail prior to the nail separation. After pathology on 7/9/19 indicated onychomycosis, terbinafine 250 mg every day was started in August 2019 and taken through January 2020. He denies any side effects from oral terbinafine. Therapy was changed to itraconazole 200 mg every day on 1/21/2020 and bleach soaks. He last took itraconazole on Friday, three days ago. He is using bleach soaks 2-3 times a week currently. He is concerned that whiteness of the nail is progressing proximally. He is trying to cover his hands every night. He does not recall any recent trauma to the nail.    We briefly discussed the process of surgical removal of nail at his last office visit if oral therapy is not effective.    Family Medical History  Skin Cancer: NO  Eczema Psoriasis Autoimmune   NO YES - in a half-brother NO     Occupation: previously warehouse, ,  previously (mostly indoor).    Refer to electronic medical record (EMR) for past medical history and medications.    ROS: 14 point review of systems was negative except the symptoms listed above in the HPI.    This document serves as a record of the services and decisions personally performed and made by Lexi Rodriges MD and was created by Luis Conti, a trained medical scribe, based on personal observations and  provider statements to the medical scribe.  March 2, 2020 3:31 PM   Luis Conti    OBJECTIVE:   GENERAL: healthy, alert and no distress.  HEENT: Eyes grossly normal to inspection, PERRL and conjunctivae and sclerae normal.  SKIN: Neumann Skin Type - I-II.  Fingers examined. The dermatoscope was used to help evaluate pigmented lesions.  Skin Pertinent Findings:  Right ring finger: Healthy proximal nail. Still lifting of the distal nail involving the first 3/4 of the proximal nail.    ASSESSMENT:     Encounter Diagnosis   Name Primary?     Onychomycosis Yes     MDM:  If there is not continue normal growth of the nail then he would like to consider removal of the nail.    PLAN:   Patient Instructions   FUTURE APPOINTMENTS    Please get labs done today.    Follow up in 4 weeks for a 40 minute appointment for re-evaluation and potential fingernail removal.    ORAL MEDICATION  Take by mouth 2 capsule(s)/tablet(s) of itraconazole 100 mg once daily for 4 more weeks.    You may discontinue bleach soaks.    TT: 20 minutes.  CT: 15 minutes.    The information in this document, created by the medical scribe for me, accurately reflects the services I personally performed and the decisions made by me. I have reviewed and approved this document for accuracy prior to leaving the patient care area.  March 2, 2020 3:31 PM  Lexi Rodriges MD  [facility]    Again, thank you for allowing me to participate in the care of your patient.        Sincerely,        Lexi Rodriges MD

## 2020-03-02 NOTE — PATIENT INSTRUCTIONS
FUTURE APPOINTMENTS    Please get labs done today.    Follow up in 4 weeks for a 40 minute appointment for re-evaluation and potential fingernail removal.    ORAL MEDICATION  Take by mouth 2 capsule(s)/tablet(s) of itraconazole 100 mg once daily for 4 more weeks.    You may discontinue bleach soaks.

## 2020-03-03 ENCOUNTER — TELEPHONE (OUTPATIENT)
Dept: FAMILY MEDICINE | Facility: CLINIC | Age: 25
End: 2020-03-03

## 2020-03-03 LAB
ALT SERPL W P-5'-P-CCNC: 48 U/L (ref 0–70)
AST SERPL W P-5'-P-CCNC: 25 U/L (ref 0–45)

## 2020-03-03 NOTE — TELEPHONE ENCOUNTER
----- Message from Lexi Rodriges MD sent at 3/3/2020  1:32 PM CST -----  Please call :      Liver function test are normal.     Thank you,   Lexi Rodriges M.D.

## 2020-03-03 NOTE — LETTER
March 5, 2020    Shant Carcamo  [address] [address]        Dear Shant,    This is a letter regarding your completed lab results. The tested values are below and were normal.    Office Visit on 03/02/2020   Component Date Value Ref Range Status     ALT 03/02/2020 48  0 - 70 U/L Final     AST 03/02/2020 25  0 - 45 U/L Final         Thank you for allowing me to be involved in your health care and for choosing [facility].  If you have any questions or concerns please feel free to contact me at [phone].      Sincerely,      Lexi Rodriges M.D.

## 2020-03-04 NOTE — TELEPHONE ENCOUNTER
Voice mail full  Sent mychart with results    Adrianna RODRIGUEZRN BSN  [facility]  [phone]

## 2020-03-05 NOTE — TELEPHONE ENCOUNTER
Letter sent    Thank you,    Adrianna KAPADIARN BSN  [facility]  [phone]  [facility]  [phone]

## 2020-04-02 DIAGNOSIS — B35.1 ONYCHOMYCOSIS: ICD-10-CM

## 2020-04-02 NOTE — TELEPHONE ENCOUNTER
Requested Prescriptions   Pending Prescriptions Disp Refills     itraconazole (SPORANOX) 100 MG capsule [Pharmacy Med Name: Itraconazole Oral Capsule 100 MG] 60 capsule 0     Sig: Take 2 capsules (200 mg) by mouth daily   Last Written Prescription Date:  3/2/20  Last Fill Quantity: 60,  # refills: 0   Last office visit: 3/2/2020 with prescribing provider:     Future Office Visit:   Next 5 appointments (look out 90 days)    Apr 08, 2020  3:40 PM CDT  Telephone Visit with Lexi Rodriegs MD  [facility] ([facility]) [address] [place] [id number]  [phone]             There is no refill protocol information for this order

## 2020-04-03 RX ORDER — ITRACONAZOLE 100 MG/1
200 CAPSULE ORAL DAILY
Qty: 60 CAPSULE | Refills: 0 | Status: SHIPPED | OUTPATIENT
Start: 2020-04-03 | End: 2020-05-04

## 2020-04-03 NOTE — TELEPHONE ENCOUNTER
Per Dr. Rodriges verbal order-  Ok to cancel telephone visit- schedule lab only for below labs.  Called patient and scheduled lab only- cancelled telephone visit.    Adrianna RODRIGUEZRN BSN  [facility]  [phone]

## 2020-04-06 DIAGNOSIS — B35.1 ONYCHOMYCOSIS: ICD-10-CM

## 2020-04-06 LAB
BASOPHILS # BLD AUTO: 0 10E9/L (ref 0–0.2)
BASOPHILS NFR BLD AUTO: 0.5 %
DIFFERENTIAL METHOD BLD: NORMAL
EOSINOPHIL # BLD AUTO: 0.4 10E9/L (ref 0–0.7)
EOSINOPHIL NFR BLD AUTO: 6 %
ERYTHROCYTE [DISTWIDTH] IN BLOOD BY AUTOMATED COUNT: 12.2 % (ref 10–15)
HCT VFR BLD AUTO: 43.1 % (ref 40–53)
HGB BLD-MCNC: 14.7 G/DL (ref 13.3–17.7)
LYMPHOCYTES # BLD AUTO: 2.1 10E9/L (ref 0.8–5.3)
LYMPHOCYTES NFR BLD AUTO: 31.8 %
MCH RBC QN AUTO: 29.9 PG (ref 26.5–33)
MCHC RBC AUTO-ENTMCNC: 34.1 G/DL (ref 31.5–36.5)
MCV RBC AUTO: 88 FL (ref 78–100)
MONOCYTES # BLD AUTO: 0.7 10E9/L (ref 0–1.3)
MONOCYTES NFR BLD AUTO: 11.2 %
NEUTROPHILS # BLD AUTO: 3.3 10E9/L (ref 1.6–8.3)
NEUTROPHILS NFR BLD AUTO: 50.5 %
PLATELET # BLD AUTO: 200 10E9/L (ref 150–450)
RBC # BLD AUTO: 4.92 10E12/L (ref 4.4–5.9)
WBC # BLD AUTO: 6.5 10E9/L (ref 4–11)

## 2020-04-06 PROCEDURE — 36415 COLL VENOUS BLD VENIPUNCTURE: CPT | Performed by: FAMILY MEDICINE

## 2020-04-06 PROCEDURE — 85025 COMPLETE CBC W/AUTO DIFF WBC: CPT | Performed by: FAMILY MEDICINE

## 2020-04-06 PROCEDURE — 84450 TRANSFERASE (AST) (SGOT): CPT | Performed by: FAMILY MEDICINE

## 2020-04-06 PROCEDURE — 84460 ALANINE AMINO (ALT) (SGPT): CPT | Performed by: FAMILY MEDICINE

## 2020-04-07 LAB
ALT SERPL W P-5'-P-CCNC: 30 U/L (ref 0–70)
AST SERPL W P-5'-P-CCNC: 23 U/L (ref 0–45)

## 2020-04-08 ENCOUNTER — TELEPHONE (OUTPATIENT)
Dept: FAMILY MEDICINE | Facility: CLINIC | Age: 25
End: 2020-04-08

## 2020-04-08 NOTE — LETTER
April 8, 2020    Shant Carcamo  [address] [place] [address]        Dear Shant,    This is a letter regarding your completed lab results. The tested values are below and were normal.    Orders Only on 04/06/2020   Component Date Value Ref Range Status     AST 04/06/2020 23  0 - 45 U/L Final     ALT 04/06/2020 30  0 - 70 U/L Final     WBC 04/06/2020 6.5  4.0 - 11.0 10e9/L Final     RBC Count 04/06/2020 4.92  4.4 - 5.9 10e12/L Final     Hemoglobin 04/06/2020 14.7  13.3 - 17.7 g/dL Final     Hematocrit 04/06/2020 43.1  40.0 - 53.0 % Final     MCV 04/06/2020 88  78 - 100 fl Final     MCH 04/06/2020 29.9  26.5 - 33.0 pg Final     MCHC 04/06/2020 34.1  31.5 - 36.5 g/dL Final     RDW 04/06/2020 12.2  10.0 - 15.0 % Final     Platelet Count 04/06/2020 200  150 - 450 10e9/L Final     % Neutrophils 04/06/2020 50.5  % Final     % Lymphocytes 04/06/2020 31.8  % Final     % Monocytes 04/06/2020 11.2  % Final     % Eosinophils 04/06/2020 6.0  % Final     % Basophils 04/06/2020 0.5  % Final     Absolute Neutrophil 04/06/2020 3.3  1.6 - 8.3 10e9/L Final     Absolute Lymphocytes 04/06/2020 2.1  0.8 - 5.3 10e9/L Final     Absolute Monocytes 04/06/2020 0.7  0.0 - 1.3 10e9/L Final     Absolute Eosinophils 04/06/2020 0.4  0.0 - 0.7 10e9/L Final     Absolute Basophils 04/06/2020 0.0  0.0 - 0.2 10e9/L Final     Diff Method 04/06/2020 Automated Method   Final         Thank you for allowing me to be involved in your health care and for choosing [facility].  If you have any questions or concerns please feel free to contact me at [phone].      Sincerely,      Lexi Rodriges M.D.

## 2020-04-08 NOTE — TELEPHONE ENCOUNTER
----- Message from Lexi Rodriges MD sent at 4/8/2020  3:42 PM CDT -----  Please call and let him know that the lab work looks good.     Thank you,   Lexi Rodriges M.D.

## 2020-05-01 DIAGNOSIS — B35.1 ONYCHOMYCOSIS: ICD-10-CM

## 2020-05-04 RX ORDER — ITRACONAZOLE 100 MG/1
200 CAPSULE ORAL DAILY
Qty: 60 CAPSULE | Refills: 0 | Status: SHIPPED | OUTPATIENT
Start: 2020-05-04 | End: 2020-05-21

## 2020-05-04 NOTE — TELEPHONE ENCOUNTER
Appointments scheduled    Adrianna KAPADIA RN  [facility]  [phone]

## 2020-05-04 NOTE — TELEPHONE ENCOUNTER
Last Written Prescription Date:  4/3/20  Last Fill Quantity: 60,  # refills: 0   Last office visit: 3/2/2020 with prescribing provider:     Future Office Visit:    Requested Prescriptions   Pending Prescriptions Disp Refills     itraconazole (SPORANOX) 100 MG capsule [Pharmacy Med Name: Itraconazole Oral Capsule 100 MG] 60 capsule 0     Sig: Take 2 capsules (200 mg) by mouth daily       There is no refill protocol information for this order

## 2020-05-18 ENCOUNTER — TELEPHONE (OUTPATIENT)
Dept: FAMILY MEDICINE | Facility: CLINIC | Age: 25
End: 2020-05-18

## 2020-05-18 DIAGNOSIS — B35.1 ONYCHOMYCOSIS: Primary | ICD-10-CM

## 2020-05-18 NOTE — TELEPHONE ENCOUNTER
This patient is coming in for a lab only appointment on 5/19/2020  and need future labs ordered.

## 2020-05-19 DIAGNOSIS — B35.1 ONYCHOMYCOSIS: ICD-10-CM

## 2020-05-19 LAB
BASOPHILS # BLD AUTO: 0 10E9/L (ref 0–0.2)
BASOPHILS NFR BLD AUTO: 0.3 %
DIFFERENTIAL METHOD BLD: NORMAL
EOSINOPHIL # BLD AUTO: 0.3 10E9/L (ref 0–0.7)
EOSINOPHIL NFR BLD AUTO: 4.6 %
ERYTHROCYTE [DISTWIDTH] IN BLOOD BY AUTOMATED COUNT: 11.8 % (ref 10–15)
HCT VFR BLD AUTO: 44.4 % (ref 40–53)
HGB BLD-MCNC: 14.8 G/DL (ref 13.3–17.7)
LYMPHOCYTES # BLD AUTO: 1.7 10E9/L (ref 0.8–5.3)
LYMPHOCYTES NFR BLD AUTO: 29.3 %
MCH RBC QN AUTO: 29.4 PG (ref 26.5–33)
MCHC RBC AUTO-ENTMCNC: 33.3 G/DL (ref 31.5–36.5)
MCV RBC AUTO: 88 FL (ref 78–100)
MONOCYTES # BLD AUTO: 0.5 10E9/L (ref 0–1.3)
MONOCYTES NFR BLD AUTO: 9 %
NEUTROPHILS # BLD AUTO: 3.3 10E9/L (ref 1.6–8.3)
NEUTROPHILS NFR BLD AUTO: 56.8 %
PLATELET # BLD AUTO: 197 10E9/L (ref 150–450)
RBC # BLD AUTO: 5.04 10E12/L (ref 4.4–5.9)
WBC # BLD AUTO: 5.9 10E9/L (ref 4–11)

## 2020-05-19 PROCEDURE — 82565 ASSAY OF CREATININE: CPT | Performed by: FAMILY MEDICINE

## 2020-05-19 PROCEDURE — 36415 COLL VENOUS BLD VENIPUNCTURE: CPT | Performed by: FAMILY MEDICINE

## 2020-05-19 PROCEDURE — 84450 TRANSFERASE (AST) (SGOT): CPT | Performed by: FAMILY MEDICINE

## 2020-05-19 PROCEDURE — 84460 ALANINE AMINO (ALT) (SGPT): CPT | Performed by: FAMILY MEDICINE

## 2020-05-19 PROCEDURE — 85025 COMPLETE CBC W/AUTO DIFF WBC: CPT | Performed by: FAMILY MEDICINE

## 2020-05-20 ENCOUNTER — TELEPHONE (OUTPATIENT)
Dept: FAMILY MEDICINE | Facility: CLINIC | Age: 25
End: 2020-05-20

## 2020-05-20 LAB
ALT SERPL W P-5'-P-CCNC: 35 U/L (ref 0–70)
AST SERPL W P-5'-P-CCNC: 18 U/L (ref 0–45)
CREAT SERPL-MCNC: 0.97 MG/DL (ref 0.66–1.25)
GFR SERPL CREATININE-BSD FRML MDRD: >90 ML/MIN/{1.73_M2}

## 2020-05-20 NOTE — LETTER
May 20, 2020    Shant Carlos Alberto  [address] [place] [address]        Dear Shant,    This is a letter regarding your completed lab results. The tested values are below and were normal.    Orders Only on 05/19/2020   Component Date Value Ref Range Status     Creatinine 05/19/2020 0.97  0.66 - 1.25 mg/dL Final     GFR Estimate 05/19/2020 >90  >60 mL/min/[1.73_m2] Final     GFR Estimate If Black 05/19/2020 >90  >60 mL/min/[1.73_m2] Final     AST 05/19/2020 18  0 - 45 U/L Final     ALT 05/19/2020 35  0 - 70 U/L Final     WBC 05/19/2020 5.9  4.0 - 11.0 10e9/L Final     RBC Count 05/19/2020 5.04  4.4 - 5.9 10e12/L Final     Hemoglobin 05/19/2020 14.8  13.3 - 17.7 g/dL Final     Hematocrit 05/19/2020 44.4  40.0 - 53.0 % Final     MCV 05/19/2020 88  78 - 100 fl Final     MCH 05/19/2020 29.4  26.5 - 33.0 pg Final     MCHC 05/19/2020 33.3  31.5 - 36.5 g/dL Final     RDW 05/19/2020 11.8  10.0 - 15.0 % Final     Platelet Count 05/19/2020 197  150 - 450 10e9/L Final     % Neutrophils 05/19/2020 56.8  % Final     % Lymphocytes 05/19/2020 29.3  % Final     % Monocytes 05/19/2020 9.0  % Final     % Eosinophils 05/19/2020 4.6  % Final     % Basophils 05/19/2020 0.3  % Final     Absolute Neutrophil 05/19/2020 3.3  1.6 - 8.3 10e9/L Final     Absolute Lymphocytes 05/19/2020 1.7  0.8 - 5.3 10e9/L Final     Absolute Monocytes 05/19/2020 0.5  0.0 - 1.3 10e9/L Final     Absolute Eosinophils 05/19/2020 0.3  0.0 - 0.7 10e9/L Final     Absolute Basophils 05/19/2020 0.0  0.0 - 0.2 10e9/L Final     Diff Method 05/19/2020 Automated Method   Final         Thank you for allowing me to be involved in your health care and for choosing [facility].  If you have any questions or concerns please feel free to contact me at [phone].      Sincerely,      Lexi Rodriges M.D.

## 2020-05-21 ENCOUNTER — VIRTUAL VISIT (OUTPATIENT)
Dept: FAMILY MEDICINE | Facility: CLINIC | Age: 25
End: 2020-05-21
Payer: COMMERCIAL

## 2020-05-21 DIAGNOSIS — B35.1 ONYCHOMYCOSIS: Primary | ICD-10-CM

## 2020-05-21 PROCEDURE — 99213 OFFICE O/P EST LOW 20 MIN: CPT | Mod: 95 | Performed by: FAMILY MEDICINE

## 2020-05-21 RX ORDER — ITRACONAZOLE 100 MG/1
200 CAPSULE ORAL DAILY
Qty: 60 CAPSULE | Refills: 0 | Status: SHIPPED | OUTPATIENT
Start: 2020-05-21

## 2020-05-21 NOTE — PROGRESS NOTES
"    Shant Carcamo is a 24 year old male who is being evaluated via a billable video visit.      The patient has been notified of following:     \"This video visit will be conducted via a call between you and your physician/provider. We have found that certain health care needs can be provided without the need for an in-person physical exam.  This service lets us provide the care you need with a video conversation.  If a prescription is necessary we can send it directly to your pharmacy.  If lab work is needed we can place an order for that and you can then stop by our lab to have the test done at a later time.    Video visits are billed at different rates depending on your insurance coverage.  Please reach out to your insurance provider with any questions.    If during the course of the call the physician/provider feels a video visit is not appropriate, you will not be charged for this service.\"    Patient has given verbal consent for Video visit? Yes    How would you like to obtain your AVS? MyChart    Patient would like the video invitation sent by: Text to cell phone: [phone]     [facility] - PRIMARY CARE SKIN    CC: onychomycosis  SUBJECTIVE:   Shant Carcamo is a(n) 24 year old male who presents to clinic today for follow-up of onychomycosis via virtual visit because of COVID-19 pandemic. It has nvolvied the right ring finger first starting in summer 2018.  Current treatment : itraconazole 200 mg q day ( 01/21/2020 ) .   Response to treatment : distal nail is almost completely connected to the nail bed.       Family Medical History  Skin Cancer: NO  Eczema Psoriasis Autoimmune   NO YES - in a half-brother NO     Occupation: previously warehouse, ,  previously (mostly indoor).    Refer to electronic medical record (EMR) for past medical history and medications.    ROS: 14 point review of systems was negative except the symptoms listed above in the HPI.        OBJECTIVE:   GENERAL: " healthy, alert and no distress.  HEENT: Eyes grossly normal to inspection, PERRL and conjunctivae and sclerae normal.  SKIN: Neumann Skin Type - I-II.  Fingers examined. The dermatoscope was used to help evaluate pigmented lesions.  Skin Pertinent Findings:  Right ring finger: distal nail is connecting to the bed of the nail    ASSESSMENT:     Encounter Diagnosis   Name Primary?     Onychomycosis Yes         PLAN:   Patient Instructions   Continue with itraconazole 200 mg orally per day - 4 weeks  Recheck only if needed    TT: 11 minutes.

## 2020-11-29 ENCOUNTER — HEALTH MAINTENANCE LETTER (OUTPATIENT)
Age: 25
End: 2020-11-29

## 2021-07-08 ENCOUNTER — NURSE TRIAGE (OUTPATIENT)
Dept: FAMILY MEDICINE | Facility: CLINIC | Age: 26
End: 2021-07-08

## 2021-07-08 NOTE — TELEPHONE ENCOUNTER
"Eye floaters. Main concern left eye.  S-(situation): Pt calling with eye concerns    B-(background): Pt stated he has noticed eye floater back in March on 2021. Pt reports has seen optometrist for new glasses in May, Optometrist did not find concerns at that time noting may be normal wear and aging. Floaters may fade with time.     A-(assessment): Pt denies eye pain, vision loss, field cut, difficulty with facial expressions, change in hearing, HA. Pt reports the left eye is the main concern with the biggest floater in the lateral portion of vision. Pt reports having a floater starting in right eye.    R-(recommendations): Pt advised to be seen in clinic for evaluation, may need follow up with ophthalmology. Patient stated an understanding and agreed with plan.  Next 5 appointments (look out 90 days)    Jul 12, 2021  4:30 PM  SHORT with Nikolas Uriostegui DO  [facility] ([facility] ) [address] [place] [id number] [phone]             Reason for Disposition    Single floater (i.e., small speck seems to float across the eye) (Exception: floater(s) are a chronic symptom and this is unchanged from patient's baseline pattern)    Additional Information    Negative: Many floaters in the eye    Answer Assessment - Initial Assessment Questions  1. DESCRIPTION: \"What is the vision loss like? Describe it for me.\" (e.g., complete vision loss, blurred vision, double vision, floaters, etc.)      floaters  2. LOCATION: \"One or both eyes?\" If one, ask: \"Which eye?\"      Left mainly, starting in right  3. SEVERITY: \"Can you see anything?\" If so, ask: \"What can you see?\" (e.g., fine print)      Can see normally, just bothersome  4. ONSET: \"When did this begin?\" \"Did it start suddenly or has this been gradual?\"      Couple months ago, march  5. PATTERN: \"Does this come and go, or has it been constant since it started?\"      constant  6. PAIN: \"Is there any " "pain in your eye(s)?\"  (Scale 1-10; or mild, moderate, severe)      no  7. CONTACTS-GLASSES: \"Do you wear contacts or glasses?\"      Glasses, recently seen in May  8. CAUSE: \"What do you think is causing this visual problem?\"      unsure  9. OTHER SYMPTOMS: \"Do you have any other symptoms?\" (e.g., confusion, headache, arm or leg weakness, speech problems)      none  10. PREGNANCY: \"Is there any chance you are pregnant?\" \"When was your last menstrual period?\"        No    Protocols used: VISION LOSS OR CHANGE-A-MAIRA SUNG RN   [facility] [place] Triage    "

## 2021-07-12 ENCOUNTER — OFFICE VISIT (OUTPATIENT)
Dept: FAMILY MEDICINE | Facility: CLINIC | Age: 26
End: 2021-07-12
Payer: COMMERCIAL

## 2021-07-12 VITALS
TEMPERATURE: 97.4 F | RESPIRATION RATE: 14 BRPM | SYSTOLIC BLOOD PRESSURE: 120 MMHG | BODY MASS INDEX: 27.46 KG/M2 | OXYGEN SATURATION: 99 % | HEIGHT: 74 IN | HEART RATE: 78 BPM | DIASTOLIC BLOOD PRESSURE: 64 MMHG | WEIGHT: 214 LBS

## 2021-07-12 DIAGNOSIS — H43.393 VITREOUS FLOATERS OF BOTH EYES: Primary | ICD-10-CM

## 2021-07-12 PROCEDURE — 99213 OFFICE O/P EST LOW 20 MIN: CPT | Performed by: FAMILY MEDICINE

## 2021-07-12 ASSESSMENT — MIFFLIN-ST. JEOR: SCORE: 2020.45

## 2021-07-12 NOTE — PROGRESS NOTES
"    Assessment & Plan     Vitreous floaters of both eyes: given ongoing symptoms and now affecting right eye, recommend consultation with ophthalmology. Referral placed.  - Adult Eye Referral; Future         BMI:   Estimated body mass index is 27.48 kg/m  as calculated from the following:    Height as of this encounter: 1.88 m (6' 2\").    Weight as of this encounter: 97.1 kg (214 lb).         Return in about 2 weeks (around 7/26/2021) for follow up if symptoms not improving.    Nikolas Uriostegui [facility] PRIOR ZACH Bran is a 26 year old who presents for the following health issues     HPI     Eye floaters  Started seeing floaters about 2 months ago (May 13). Seems the size of a gnat or small fly. Floater is in the left field of vision. Notices more with bright lights or light surfaces. Floater also started forming in the right eye a couple weeks ago. Notices this in the lateral field of vision as well. No eye pain. He saw his optometrist in May after floaters started. They didn't see any issues at the time. He is near-sighted.     Review of Systems   Constitutional, HEENT, cardiovascular, pulmonary, gi and gu systems are negative, except as otherwise noted.      Objective    /64   Pulse 78   Temp 97.4  F (36.3  C) (Tympanic)   Resp 14   Ht 1.88 m (6' 2\")   Wt 97.1 kg (214 lb)   SpO2 99%   BMI 27.48 kg/m    Body mass index is 27.48 kg/m .  Physical Exam   GENERAL: healthy, alert and no distress  EYES: Eyes grossly normal to inspection, PERRL, EOMI, visual fields normal and conjunctivae and sclerae normal            "

## 2021-07-12 NOTE — PATIENT INSTRUCTIONS
Patient Education     What Are Flashes and Floaters?  Have you ever seen flashes of light, stars, or streaks that aren t really there? A few of these flashes are seen by everyone from time to time. Usually you see them in one eye at a time. Flashes are often caused by the gel filling inside of your eye, called the vitreous, pulling on the retina. The retina is a membrane that lines the inside of your eye.   Floaters look like dark specks, clouds, threads, or spider webs moving through your eyesight. Most people see them once in a while. Floaters may be pieces of gel or other material floating inside your eye. They are usually harmless.     Who gets flashes?  As you age or if you are nearsighted, you are more likely to see flashes. Nearsightedness is when you have fuzzy distance vision. Sometimes flashes are a sign of other eye problems that should be evaluated and may need care.   Who gets floaters?  The older you get, the more likely you ll notice floaters. Floaters can also be caused by an eye injury or surgery. People who are very nearsighted may get more floaters. If floaters appear suddenly or greatly increase in number, see your healthcare provider as soon as possible. This may be a sign of an eye problem.   [employer] last reviewed this educational content on 8/1/2020 2000-2021 [employer]. All rights reserved. This information is not intended as a substitute for professional medical care. Always follow your healthcare professional's instructions.           Patient Education     Treating Flashes and Floaters    Most often, seeing a few flashes and floaters is normal. Also, some people may notice them for a while after eye surgery. Most flashes and floaters need no treatment. But sometimes they can be signs of a serious eye problem.   When do flashes need treatment?  Flashes that appear all of a sudden or greatly increase in number may be a sign of a problem. They may be caused by the vitreous  pulling on the retina. A retinal tear needs urgent treatment. It can cause the retina to detach from the back of your eye. Rapid vision loss can result. Your eye care provider can find the cause of flashes and decide if treatment is needed.   When do floaters need treatment?  A sudden increase in the number of floaters you see may be a sign of a tear in the retina or of some other eye problem. Over time, a tear can cause the retina to detach from the back of the eye. Your eye care provider can find out what is causing the floaters and advise a treatment plan, if needed.   Warning signs  If you have a sudden increase in floaters or flashes, or if part of your vision is missing, see your eye care provider as soon as possible. These may be symptoms of a retinal tear or detachment, which can cause loss of vision. You will need a complete, dilated eye exam to find the problem.   [place] last reviewed this educational content on 7/1/2020 2000-2021 [employer]. All rights reserved. This information is not intended as a substitute for professional medical care. Always follow your healthcare professional's instructions.

## 2021-09-25 ENCOUNTER — HEALTH MAINTENANCE LETTER (OUTPATIENT)
Age: 26
End: 2021-09-25

## 2022-01-15 ENCOUNTER — HEALTH MAINTENANCE LETTER (OUTPATIENT)
Age: 27
End: 2022-01-15

## 2022-12-26 ENCOUNTER — HEALTH MAINTENANCE LETTER (OUTPATIENT)
Age: 27
End: 2022-12-26

## 2023-04-16 ENCOUNTER — HEALTH MAINTENANCE LETTER (OUTPATIENT)
Age: 28
End: 2023-04-16

## 2024-06-23 ENCOUNTER — HEALTH MAINTENANCE LETTER (OUTPATIENT)
Age: 29
End: 2024-06-23